# Patient Record
(demographics unavailable — no encounter records)

---

## 2025-06-13 NOTE — PLAN
[FreeTextEntry1] : 55y/o presents to discuss pathology results D&C hysteroscopy. Pathology showed  well differentiated endometrioid adenocarcinoma, FIGO Grade 1 -Results discussed with pt -Mirena IUD placed at time of procedure -Email sent to GYN Oncology navigator to assist with prompt scheduling with GYN Oncology -All questions answered to apparent satisfaction   henna MORILLO

## 2025-06-13 NOTE — HISTORY OF PRESENT ILLNESS
[FreeTextEntry1] : This visit was provided via telehealth using real-time 2-way audio visual technology. The patient, MAXI HERNANDEZ  , was located at home, [address] [place] [address] , at the time of the visit.  The provider, TOLU HASSAN , was located at the medical office located in [facility] at the time of the visit. The patient MAXI HERNANDEZ  and Provider participated in the telehealth encounter.  Verbal consent given on 06/13/2025  by the patient, self. This visit was provided via telehealth using real-time 2-way audio visual technology. The patient, MAXI HERNANDEZ  , was located at home, [address] [place] [address] , at the time of the visit.  The provider, TOLU HASSAN , was located at the medical office located in [facility] at the time of the visit. The patient MAXI HERNANDEZ  and Provider participated in the telehealth encounter.  Verbal consent given on 06/13/2025  by the patient, self.  55y/o presents to review pathology for D&C hysteroscopy Mirena IUD placement  Pt underwent procedure due to postmenopausal bleeding. Pathology reveals well differentiated endometrioid adenocarcinoma, FIGO Grade 1  Results discussed with pt  Pt overall feels well, reports light spotting, denies pain.

## 2025-06-18 NOTE — PHYSICAL EXAM
[Chaperoned Physical Exam] : A chaperone was present in the examining room during all aspects of the physical examination. [Normal] : Recto-Vaginal Exam: Normal [Fully active, able to carry on all pre-disease performance without restriction] : Status 0 - Fully active, able to carry on all pre-disease performance without restriction

## 2025-07-16 NOTE — DISCUSSION/SUMMARY
[FreeTextEntry1] : 53 yo with G1 endometrioid adenocarcinoma, very elevated A1c, 13.8  IUD has been displaced Discussed treatment with PO progestin, Megace 40BID Discussed possible side effects of this medication: nausea, headache, vaginal bleeding, increased appetite I discussed that we should perform surgery as soon as feasible and I discussed importance of glycemic control, discussed I would prefer A1c under 8 but in light of events would consider under 10 as acceptable risk.   She is seeing PMD tomorrow, updated labs pending Follow up in 1 month   Pebbles Sweet MD, FACOG  Gynecologic Oncology

## 2025-07-16 NOTE — PHYSICAL EXAM
[Chaperoned Physical Exam] : A chaperone was present in the examining room during all aspects of the physical examination. [FreeTextEntry2] : tasha [Normal] : Adnexa(ae): Normal [de-identified] : NO IUD STRINGS SEEN [Fully active, able to carry on all pre-disease performance without restriction] : Status 0 - Fully active, able to carry on all pre-disease performance without restriction

## 2025-07-16 NOTE — HISTORY OF PRESENT ILLNESS
[FreeTextEntry1] : 53 yo with FIGO G1 endometrial cancer, had IUD in situ A1c very elevated Treatment with IUD as bridge to surgery MRI done to assess myoinvasion, IUD not seen  Patient with heavy bleeding episode 7/4/25 for ~3d, unsure if IUD passed. Had heavy bleeding and clots in toilet  Brought in to check strings because they were seen on last exam and to discuss plan.  States sugars have not significantly improved.   7/6/25 MRI *** ADDENDUM # 1 ***  As queried, there is mild ill-definition of the endometrial-myometrial interface without definitive myometrial invasion. No IUD is definitively identified within the endometrial canal. --- End of Report ---  *** END OF ADDENDUM # 1 ***  PROCEDURE DATE:  07/06/2025    INTERPRETATION:  CLINICAL INFORMATION: s/p biopsy. initial staging endometrial cancer  COMPARISON: There are no previous examinations available for comparison.  CONTRAST/COMPLICATIONS: IV Contrast: Gadavist    10 cc administered     0 cc discarded Oral Contrast: NONE .  PROCEDURE: MRI of the pelvis was performed.  FINDINGS: UTERUS: The uterus measures 6.7 x 6.8 x 9.4 cm. There is a small intramural right uterine body fibroid measures 1.4 cm. Few nabothian cysts are noted. ENDOMETRIUM: Diffusely thickened and heterogeneously enhancing and diffusion restricting endometrium measuring up to 2.7 cm, in keeping with known malignancy.  RIGHT OVARY: T1 and T2 iso-/hypointense focus abutting the right ovary measures 1.9 x 1.8 cm (5/18) without evidence of enhancement, indeterminate, may represent a fibroma, calcified broad ligament fibroid, among other differentials. LEFT OVARY: No discrete mass.  BLADDER: Within normal limits.  LYMPH NODES: Prominent bilateral external iliac lymph nodes measures up to 1.2 cm on the right (28/42 and 1 cm on the left (28/43). Otherwise, no lymphadenopathy  VISUALIZED PORTIONS:  ABDOMINAL ORGANS: Within normal limits. BOWEL: Scattered colonic diverticulosis. PERITONEUM: No ascites. VESSELS: Within normal limits. ABDOMINAL WALL: Partially imaged fat-containing ventral abdominal hernia. BONES: Within normal limits.  IMPRESSION:  1. Diffusely thickened and heterogeneous enhancing endometrium, in keeping with known malignancy.  2. Prominent bilateral external iliac lymph nodes. No pathologic lymphadenopathy by imaging criteria.  3. Indeterminate intermediate signal focus within the right ovary (1.9 cm) without definite enhancement. Differential considerations include a small fibroma, a calcified broad ligament fibroid, among other differentials. Attention on follow-up.

## 2025-07-16 NOTE — PHYSICAL EXAM
[Chaperoned Physical Exam] : A chaperone was present in the examining room during all aspects of the physical examination. [FreeTextEntry2] : tasha [Normal] : Adnexa(ae): Normal [de-identified] : NO IUD STRINGS SEEN [Fully active, able to carry on all pre-disease performance without restriction] : Status 0 - Fully active, able to carry on all pre-disease performance without restriction